# Patient Record
Sex: FEMALE | Race: WHITE | Employment: STUDENT | URBAN - METROPOLITAN AREA
[De-identification: names, ages, dates, MRNs, and addresses within clinical notes are randomized per-mention and may not be internally consistent; named-entity substitution may affect disease eponyms.]

---
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
## 2022-05-17 ENCOUNTER — HOSPITAL ENCOUNTER (OUTPATIENT)
Age: 22
Discharge: HOME | End: 2022-05-17
Payer: COMMERCIAL

## 2022-05-17 VITALS
SYSTOLIC BLOOD PRESSURE: 138 MMHG | HEART RATE: 67 BPM | TEMPERATURE: 98 F | OXYGEN SATURATION: 100 % | DIASTOLIC BLOOD PRESSURE: 61 MMHG | RESPIRATION RATE: 20 BRPM

## 2022-05-17 DIAGNOSIS — R06.2 WHEEZING: ICD-10-CM

## 2022-05-17 DIAGNOSIS — R05.9 COUGH: Primary | ICD-10-CM

## 2022-05-17 PROCEDURE — 99203 OFFICE O/P NEW LOW 30 MIN: CPT | Performed by: NURSE PRACTITIONER

## 2022-05-17 RX ORDER — PREDNISONE 20 MG/1
40 TABLET ORAL DAILY
Qty: 10 TABLET | Refills: 0 | Status: SHIPPED | OUTPATIENT
Start: 2022-05-17 | End: 2022-05-22

## 2022-05-17 NOTE — ED INITIAL ASSESSMENT (HPI)
Patient is here with a cough that she states she has had for the last three weeks. She states she is still coughing up green phlegm and her mom wants her checked out. She states she has asthma.   She does not want a covid test.

## 2022-05-20 ENCOUNTER — HOSPITAL ENCOUNTER (OUTPATIENT)
Age: 22
Discharge: HOME | End: 2022-05-20
Payer: COMMERCIAL

## 2022-05-20 VITALS
OXYGEN SATURATION: 100 % | DIASTOLIC BLOOD PRESSURE: 68 MMHG | SYSTOLIC BLOOD PRESSURE: 133 MMHG | RESPIRATION RATE: 20 BRPM | TEMPERATURE: 98 F | HEART RATE: 72 BPM

## 2022-05-20 DIAGNOSIS — J45.909 ACUTE ASTHMA: Primary | ICD-10-CM

## 2022-05-20 PROCEDURE — 99213 OFFICE O/P EST LOW 20 MIN: CPT | Performed by: PHYSICIAN ASSISTANT

## 2022-05-20 RX ORDER — PREDNISONE 10 MG/1
TABLET ORAL
Qty: 45 TABLET | Refills: 0 | Status: SHIPPED | OUTPATIENT
Start: 2022-05-20 | End: 2022-05-30

## 2022-05-20 RX ORDER — ALBUTEROL SULFATE 90 UG/1
2 AEROSOL, METERED RESPIRATORY (INHALATION) EVERY 4 HOURS PRN
Qty: 1 EACH | Refills: 0 | Status: SHIPPED | OUTPATIENT
Start: 2022-05-20 | End: 2022-06-19

## 2022-05-20 NOTE — ED INITIAL ASSESSMENT (HPI)
Patient was here on Tuesday and was started on a steroid and she states she still has a wheeze but her cough is better.

## 2022-05-22 ENCOUNTER — HOSPITAL ENCOUNTER (OUTPATIENT)
Age: 22
Discharge: HOME | End: 2022-05-22
Payer: COMMERCIAL

## 2022-05-22 ENCOUNTER — APPOINTMENT (OUTPATIENT)
Dept: GENERAL RADIOLOGY | Age: 22
End: 2022-05-22
Attending: NURSE PRACTITIONER
Payer: COMMERCIAL

## 2022-05-22 VITALS
TEMPERATURE: 98 F | DIASTOLIC BLOOD PRESSURE: 74 MMHG | HEART RATE: 70 BPM | BODY MASS INDEX: 24.34 KG/M2 | WEIGHT: 170 LBS | HEIGHT: 70 IN | RESPIRATION RATE: 17 BRPM | SYSTOLIC BLOOD PRESSURE: 125 MMHG | OXYGEN SATURATION: 100 %

## 2022-05-22 DIAGNOSIS — R05.9 COUGH: ICD-10-CM

## 2022-05-22 DIAGNOSIS — J30.2 SEASONAL ALLERGIES: Primary | ICD-10-CM

## 2022-05-22 PROCEDURE — 99213 OFFICE O/P EST LOW 20 MIN: CPT | Performed by: NURSE PRACTITIONER

## 2022-05-22 PROCEDURE — 71046 X-RAY EXAM CHEST 2 VIEWS: CPT | Performed by: NURSE PRACTITIONER

## 2024-01-03 ENCOUNTER — HOSPITAL ENCOUNTER (OUTPATIENT)
Age: 24
Discharge: HOME | End: 2024-01-03
Payer: COMMERCIAL

## 2024-01-03 VITALS
HEART RATE: 76 BPM | TEMPERATURE: 98 F | SYSTOLIC BLOOD PRESSURE: 135 MMHG | OXYGEN SATURATION: 100 % | DIASTOLIC BLOOD PRESSURE: 92 MMHG | RESPIRATION RATE: 18 BRPM

## 2024-01-03 DIAGNOSIS — S90.852A FOREIGN BODY IN LEFT FOOT, INITIAL ENCOUNTER: Primary | ICD-10-CM

## 2024-01-03 NOTE — DISCHARGE INSTRUCTIONS
Continue warm soaks.  Topical antibiotic ointment.  Follow-up with your primary doctor if any persistent symptoms

## 2024-01-03 NOTE — ED PROVIDER NOTES
Patient Seen in: [facility]      History     Chief Complaint   Patient presents with    Foot Pain     Stated Complaint: Glass in Foot    Subjective:   23-year-old female with no known medical problems presents from home with a piece of glass to her left foot.  States she stepped on a broken glass jar about 3 days ago.  States she still has a piece of glass in the bottom of her left foot.  She has been trying to remove it by squeezing it without any success.  She has also been performing warm soaks at home.  Minimal pain, states it is more annoying.  Uncomfortable when she steps on it.  No drainage.  No bleeding.  No fever.  Immunizations are up-to-date.    The history is provided by the patient. No  was used.         Objective:   Past Medical History:   Diagnosis Date    Asthma     Irregular menses     Seasonal allergies           HISTORY:  Past Medical History:   Diagnosis Date    Asthma     Irregular menses     Seasonal allergies       History reviewed. No pertinent surgical history.   No family history on file.   Social History     Socioeconomic History    Marital status: Single   Tobacco Use    Smoking status: Never    Smokeless tobacco: Never   Vaping Use    Vaping Use: Never used   Substance and Sexual Activity    Alcohol use: Yes    Drug use: Never    Sexual activity: Never   Social History Narrative    ** Merged History Encounter **                 History reviewed. No pertinent surgical history.             Social History     Socioeconomic History    Marital status: Single   Tobacco Use    Smoking status: Never    Smokeless tobacco: Never   Vaping Use    Vaping Use: Never used   Substance and Sexual Activity    Alcohol use: Yes    Drug use: Never    Sexual activity: Never   Social History Narrative    ** Merged History Encounter **                   Review of Systems    Positive for stated complaint: Glass in Foot  Other systems are as noted in HPI.  Constitutional and  vital signs reviewed.      All other systems reviewed and negative except as noted above.    Physical Exam     ED Triage Vitals [01/03/24 1335]   BP (!) 135/92   Pulse 76   Resp 18   Temp 98.3 °F (36.8 °C)   Temp src Oral   SpO2 100 %   O2 Device None (Room air)       Current:BP (!) 135/92   Pulse 76   Temp 98.3 °F (36.8 °C) (Oral)   Resp 18   SpO2 100%         Physical Exam  Vitals and nursing note reviewed.   Constitutional:       General: She is not in acute distress.     Appearance: Normal appearance. She is not ill-appearing or toxic-appearing.   HENT:      Head: Normocephalic and atraumatic.      Nose: Nose normal.      Mouth/Throat:      Mouth: Mucous membranes are moist.      Pharynx: Oropharynx is clear.   Eyes:      Pupils: Pupils are equal, round, and reactive to light.   Cardiovascular:      Rate and Rhythm: Normal rate and regular rhythm.      Pulses: Normal pulses.   Pulmonary:      Effort: Pulmonary effort is normal. No respiratory distress.   Abdominal:      General: Abdomen is flat.      Palpations: Abdomen is soft.   Musculoskeletal:         General: No signs of injury. Normal range of motion.      Cervical back: Normal range of motion and neck supple.   Skin:     General: Skin is warm and dry.      Capillary Refill: Capillary refill takes less than 2 seconds.      Comments: Small puncture with mild tenderness to bottom of left foot, pad of foot near base of 4th toe. No erythema or drainage   Neurological:      General: No focal deficit present.      Mental Status: She is alert and oriented to person, place, and time.      GCS: GCS eye subscore is 4. GCS verbal subscore is 5. GCS motor subscore is 6.   Psychiatric:         Mood and Affect: Mood normal.         Behavior: Behavior normal.         Thought Content: Thought content normal.         Judgment: Judgment normal.               ED Course   Labs Reviewed - No data to display  Foreign body removal   A small glass foreign body was removed  from the left foot. No anesthesia used. Procedure performed with 21 g needle and blunt dissection.  The procedure was performed manually. The procedure was performed by myself. The patient tolerated the procedure well.       MDM          Medical Decision Making  Foreign body removal left foot  Small piece of glass removed from the bottom of the left foot.  No drainage or cellulitis  Area is nontender.  Low suspicion for retained foreign body.  Offered x-ray of the foot but patient declined.  States she is feeling much better.  Area cleaned with alcohol.  Antibiotic ointment and Band-Aid applied  Tetanus updated  Recommend continuing warm soaks and antibiotic ointment.  Follow-up with primary doctor for any persistent symptoms  Results and plan of care discussed with the patient/family. They are in agreement with discharge. They understand to follow up with their primary doctor or the referral physician for further evaluation, especially if no improvement.  Also discussed the limitations of immediate care, patient is aware that if symptoms are worse they should go to the emergency room. Verbal and written discharge instructions were given.       Problems Addressed:  Foreign body in left foot, initial encounter: acute illness or injury    Risk  OTC drugs.        Disposition and Plan     Clinical Impression:  1. Foreign body in left foot, initial encounter         Disposition:  Discharge  1/3/2024  1:29 pm    Follow-up:  Roberta Horn  [address] [facility] [address]  [phone]                Medications Prescribed:  Discharge Medication List as of 1/3/2024  1:36 PM

## 2024-07-15 ENCOUNTER — HOSPITAL ENCOUNTER (OUTPATIENT)
Age: 24
Discharge: HOME | End: 2024-07-15
Payer: COMMERCIAL

## 2024-07-15 VITALS
RESPIRATION RATE: 16 BRPM | DIASTOLIC BLOOD PRESSURE: 75 MMHG | SYSTOLIC BLOOD PRESSURE: 138 MMHG | OXYGEN SATURATION: 100 % | HEART RATE: 65 BPM | TEMPERATURE: 98 F

## 2024-07-15 DIAGNOSIS — N89.8 VAGINAL DISCHARGE: Primary | ICD-10-CM

## 2024-07-15 LAB — B-HCG UR QL: NEGATIVE

## 2024-07-15 PROCEDURE — 81514 NFCT DS BV&VAGINITIS DNA ALG: CPT | Performed by: PHYSICIAN ASSISTANT

## 2024-07-15 PROCEDURE — 81002 URINALYSIS NONAUTO W/O SCOPE: CPT | Performed by: PHYSICIAN ASSISTANT

## 2024-07-15 PROCEDURE — 87491 CHLMYD TRACH DNA AMP PROBE: CPT | Performed by: PHYSICIAN ASSISTANT

## 2024-07-15 PROCEDURE — 87591 N.GONORRHOEAE DNA AMP PROB: CPT | Performed by: PHYSICIAN ASSISTANT

## 2024-07-15 PROCEDURE — 99214 OFFICE O/P EST MOD 30 MIN: CPT | Performed by: PHYSICIAN ASSISTANT

## 2024-07-15 PROCEDURE — 81025 URINE PREGNANCY TEST: CPT | Performed by: PHYSICIAN ASSISTANT

## 2024-07-15 RX ORDER — CLOTRIMAZOLE 1 %
1 CREAM (GRAM) TOPICAL 2 TIMES DAILY
Qty: 28 G | Refills: 0 | Status: SHIPPED | OUTPATIENT
Start: 2024-07-15 | End: 2024-07-22

## 2024-07-16 LAB
BILIRUB UR QL STRIP: NEGATIVE
BV BACTERIA DNA VAG QL NAA+PROBE: NEGATIVE
C GLABRATA DNA VAG QL NAA+PROBE: NEGATIVE
C KRUSEI DNA VAG QL NAA+PROBE: NEGATIVE
C TRACH DNA SPEC QL NAA+PROBE: NEGATIVE
CANDIDA DNA VAG QL NAA+PROBE: NEGATIVE
CLARITY UR: CLEAR
COLOR UR: YELLOW
GLUCOSE UR STRIP-MCNC: NEGATIVE MG/DL
HGB UR QL STRIP: NEGATIVE
KETONES UR STRIP-MCNC: NEGATIVE MG/DL
LEUKOCYTE ESTERASE UR QL STRIP: NEGATIVE
N GONORRHOEA DNA SPEC QL NAA+PROBE: NEGATIVE
NITRITE UR QL STRIP: NEGATIVE
PH UR STRIP: 6 [PH]
PROT UR STRIP-MCNC: NEGATIVE MG/DL
SP GR UR STRIP: 1.01
T VAGINALIS DNA VAG QL NAA+PROBE: NEGATIVE
UROBILINOGEN UR STRIP-ACNC: <2 MG/DL

## 2024-07-16 NOTE — ED PROVIDER NOTES
Patient Seen in: [facility]    History     Chief Complaint   Patient presents with    Gyn Exam     Stated Complaint: gynecological exam    HPI    Khloe Hannah is a 24 year old female who presents with chief complaint of vaginal itching.  Onset 1 week ago.  Patient states she has been treated for vaginal yeast infections over the past couple months.  Patient states that symptoms feel similar.  Patient denies fever, chills, abdominal pain, nausea, vomiting, diarrhea, constipation, melena, hematochezia, dysuria, hematuria, flank pain, vaginal bleeding.            Past Medical History:    Asthma (HCC)    Irregular menses    Seasonal allergies       No past surgical history on file.         No family history on file.    Social History     Socioeconomic History    Marital status: Single   Tobacco Use    Smoking status: Never    Smokeless tobacco: Never   Vaping Use    Vaping status: Never Used   Substance and Sexual Activity    Alcohol use: Yes    Drug use: Never    Sexual activity: Never   Social History Narrative    ** Merged History Encounter **            Review of Systems    Positive for stated complaint: gynecological exam  Other systems are as noted in HPI.  Constitutional and vital signs reviewed.      All other systems reviewed and negative except as noted above.    PSFH elements reviewed from today and agreed except as otherwise stated in HPI.    Physical Exam     ED Triage Vitals [07/15/24 1908]   /75   Pulse 65   Resp 16   Temp 98.1 °F (36.7 °C)   Temp src Oral   SpO2 100 %   O2 Device None (Room air)       Current:/75   Pulse 65   Temp 98.1 °F (36.7 °C) (Oral)   Resp 16   SpO2 100%     PULSE OX within normal limits on room air as interpreted by this provider.        Physical Exam    Constitutional: The patient is cooperative. Appears well-developed and well-nourished.  No acute distress.  Psychological: Alert, No abnormalities of mood, affect.  Head:  Normocephalic/atraumatic.  Eyes: Pupils are equal round reactive to light.  Conjunctiva are within normal limits.  ENT: Oropharynx is clear.  Mucous membranes moist.  Neck: The neck is supple.  No meningeal signs.  Chest: There is no tenderness to the chest wall.  No CVA tenderness bilaterally.  Respiratory: Respiratory effort was normal.  There is no stridor.  Air entry is equal.  Cardiovascular: Regular rate and rhythm.  Capillary refill is brisk.    Gastrointestinal: Abdomen soft, nontender, nondistended.  There is no rebound tenderness or guarding.  No organomegaly is noted. No peritoneal signs.  Normal bowel sounds.  Genitourinary: Small amount of white, non-malodorous discharge present in vaginal vault.  No vaginal bleeding.  No lesions.  Os closed.  No cervical erythema or cervical motion tenderness.  No adnexal tenderness or mass bilaterally.  Uterus nontender.  Lymphatic: No gross lymphadenopathy noted.  Musculoskeletal: Musculoskeletal system is grossly intact.  There is no obvious deformity.  Neurological: Gross motor movement is intact in all 4 extremities.  Patient exhibits normal speech.  Skin: Skin is normal to inspection.  Warm and dry.  No obvious bruising.  No obvious rash.        ED Course     Labs Reviewed   POCT PREGNANCY URINE - Normal       MDM     Urine dip within normal limits.  Vaginal swabs pending.    Physical exam remained stable as previously documented.  Available results reviewed with patient.    I have given the patient instructions regarding their diagnoses, expectations, follow up, and ER precautions. I explained to the patient that emergent conditions may arise and to go to the ER for new, worsening or any persistent conditions. I've explained the importance of following up with their doctor as instructed. The patient verbalized understanding of the discharge instructions and plan.    Disposition and Plan     Clinical Impression:  1. Vaginal discharge         Disposition:  Discharge    Follow-up:  Sammie Botello, NP  [address] [facility]  [address] [place] [address]  [phone]    Call in 1 day  For follow-up      Medications Prescribed:  Current Discharge Medication List        START taking these medications    Details   clotrimazole 1 % External Cream Apply 1 Application topically 2 (two) times daily for 7 days.  Qty: 28 g, Refills: 0

## 2024-07-16 NOTE — ED QUICK NOTES
Urine dip  Glucose - negative  Bili - negative  Ketone - Negative  SG - 1.010  Blood - Negative  Ph- 6.0  Protein - Negative  Uro - 0.2 E.U./dl  Nitrite - Negative  Leukocytes - Negative

## 2024-07-25 LAB
BILIRUB UR QL STRIP: NEGATIVE
CLARITY UR: CLEAR
COLOR UR: YELLOW
GLUCOSE UR STRIP-MCNC: NEGATIVE MG/DL
HGB UR QL STRIP: NEGATIVE
KETONES UR STRIP-MCNC: NEGATIVE MG/DL
LEUKOCYTE ESTERASE UR QL STRIP: NEGATIVE
NITRITE UR QL STRIP: NEGATIVE
PH UR STRIP: 6 [PH]
PROT UR STRIP-MCNC: NEGATIVE MG/DL
SP GR UR STRIP: 1.01
UROBILINOGEN UR STRIP-ACNC: <2 MG/DL

## 2025-08-04 ENCOUNTER — OFFICE VISIT (OUTPATIENT)
Age: 25
End: 2025-08-04

## 2025-08-04 VITALS
HEART RATE: 61 BPM | BODY MASS INDEX: 28.22 KG/M2 | RESPIRATION RATE: 16 BRPM | HEIGHT: 70 IN | OXYGEN SATURATION: 99 % | WEIGHT: 197.09 LBS | DIASTOLIC BLOOD PRESSURE: 70 MMHG | SYSTOLIC BLOOD PRESSURE: 124 MMHG

## 2025-08-04 DIAGNOSIS — N89.8 VAGINAL PRURITUS: ICD-10-CM

## 2025-08-04 DIAGNOSIS — N89.8 VAGINAL DISCHARGE: Primary | ICD-10-CM

## 2025-08-04 PROCEDURE — 81513 NFCT DS BV RNA VAG FLU ALG: CPT | Performed by: CLINICAL MEDICAL LABORATORY

## 2025-08-04 PROCEDURE — 87661 TRICHOMONAS VAGINALIS AMPLIF: CPT | Performed by: CLINICAL MEDICAL LABORATORY

## 2025-08-04 PROCEDURE — 87481 CANDIDA DNA AMP PROBE: CPT | Performed by: CLINICAL MEDICAL LABORATORY

## 2025-08-04 PROCEDURE — 87591 N.GONORRHOEAE DNA AMP PROB: CPT | Performed by: CLINICAL MEDICAL LABORATORY

## 2025-08-04 PROCEDURE — 87491 CHLMYD TRACH DNA AMP PROBE: CPT | Performed by: CLINICAL MEDICAL LABORATORY

## 2025-08-04 PROCEDURE — 99203 OFFICE O/P NEW LOW 30 MIN: CPT | Performed by: STUDENT IN AN ORGANIZED HEALTH CARE EDUCATION/TRAINING PROGRAM

## 2025-08-04 RX ORDER — ALBUTEROL SULFATE 90 UG/1
INHALANT RESPIRATORY (INHALATION)
COMMUNITY
Start: 2025-04-25

## 2025-08-04 RX ORDER — MONTELUKAST SODIUM 10 MG/1
TABLET ORAL
COMMUNITY
Start: 2025-07-30

## 2025-08-04 RX ORDER — SPIRONOLACTONE 50 MG/1
150 TABLET, FILM COATED ORAL DAILY
COMMUNITY
Start: 2025-03-30

## 2025-08-04 ASSESSMENT — PAIN SCALES - GENERAL: PAINLEVEL_OUTOF10: 0

## 2025-08-05 LAB
BACTERIAL VAGINOSIS VAG-IMP: NOT DETECTED
C ALBICANS DNA VAG QL NAA+PROBE: NOT DETECTED
C GLABRATA DNA VAG QL NAA+PROBE: NOT DETECTED
C TRACH RRNA SPEC QL NAA+PROBE: NEGATIVE
N GONORRHOEA RRNA SPEC QL NAA+PROBE: NEGATIVE
SERVICE CMNT-IMP: NORMAL
T VAGINALIS DNA VAG QL NAA+PROBE: NOT DETECTED

## 2025-10-14 ENCOUNTER — APPOINTMENT (OUTPATIENT)
Age: 25
End: 2025-10-14

## (undated) NOTE — LETTER
[address] [place] [id number]  Dept: [phone]  Dept Fax: [phone]      May 21, 2017    Patient: Radha Chris   Date of Visit: 5/21/2017       To Whom It May Concern:    Radha Chris was seen a

## (undated) NOTE — ED AVS SNAPSHOT
[facility] in [address] [address]    Phone:  [phone]    Fax:  [phone]           Orlando Alcazar   MRN: [id number]    Department:  [facility] in [facility]   Date of Visit: service to you, we would appreciate any positive or negative feedback related to the care you received in our Immediate [facility]. Please call our [address] at [phone]. Your Immediate Care team is here to serve you. You are our top priority.     You w [address] [facility]  WEEKENDS AND HOLIDAYS 8AM - 6PM    I certified that I have received a copy of the aftercare instructions; that these instructions have been explained to me; all questions pertaining to these instructions have been answered visit, view other health information and more. To sign up or find more information on getting   Proxy Access to your child’s MyChart go to [email or web address] and click on the   Sign Up Forms link in the Additional Information box on the right.